# Patient Record
Sex: FEMALE | URBAN - METROPOLITAN AREA
[De-identification: names, ages, dates, MRNs, and addresses within clinical notes are randomized per-mention and may not be internally consistent; named-entity substitution may affect disease eponyms.]

---

## 2023-03-23 ENCOUNTER — APPOINTMENT (OUTPATIENT)
Dept: URBAN - METROPOLITAN AREA CLINIC 261 | Age: 43
Setting detail: DERMATOLOGY
End: 2023-03-23

## 2023-03-23 DIAGNOSIS — D22 MELANOCYTIC NEVI: ICD-10-CM

## 2023-03-23 DIAGNOSIS — H01.00 UNSPECIFIED BLEPHARITIS: ICD-10-CM

## 2023-03-23 DIAGNOSIS — Z12.83 ENCOUNTER FOR SCREENING FOR MALIGNANT NEOPLASM OF SKIN: ICD-10-CM

## 2023-03-23 DIAGNOSIS — L82.1 OTHER SEBORRHEIC KERATOSIS: ICD-10-CM

## 2023-03-23 DIAGNOSIS — L81.4 OTHER MELANIN HYPERPIGMENTATION: ICD-10-CM

## 2023-03-23 DIAGNOSIS — D18.0 HEMANGIOMA: ICD-10-CM

## 2023-03-23 PROBLEM — D22.5 MELANOCYTIC NEVI OF TRUNK: Status: ACTIVE | Noted: 2023-03-23

## 2023-03-23 PROBLEM — H01.009 UNSPECIFIED BLEPHARITIS UNSPECIFIED EYE, UNSPECIFIED EYELID: Status: ACTIVE | Noted: 2023-03-23

## 2023-03-23 PROBLEM — D18.01 HEMANGIOMA OF SKIN AND SUBCUTANEOUS TISSUE: Status: ACTIVE | Noted: 2023-03-23

## 2023-03-23 PROCEDURE — OTHER PRESCRIPTION: OTHER

## 2023-03-23 PROCEDURE — OTHER PRESCRIPTION MEDICATION MANAGEMENT: OTHER

## 2023-03-23 PROCEDURE — OTHER MIPS QUALITY: OTHER

## 2023-03-23 PROCEDURE — OTHER COUNSELING: OTHER

## 2023-03-23 PROCEDURE — OTHER ADDITIONAL NOTES: OTHER

## 2023-03-23 PROCEDURE — 99204 OFFICE O/P NEW MOD 45 MIN: CPT

## 2023-03-23 RX ORDER — TACROLIMUS 1 MG/G
OINTMENT TOPICAL BID
Qty: 100 | Refills: 10 | Status: ERX | COMMUNITY
Start: 2023-03-23

## 2023-03-23 ASSESSMENT — LOCATION SIMPLE DESCRIPTION DERM
LOCATION SIMPLE: ABDOMEN
LOCATION SIMPLE: LEFT UPPER BACK
LOCATION SIMPLE: RIGHT CHEEK
LOCATION SIMPLE: LEFT CHEEK
LOCATION SIMPLE: CHEST
LOCATION SIMPLE: RIGHT UPPER BACK

## 2023-03-23 ASSESSMENT — LOCATION DETAILED DESCRIPTION DERM
LOCATION DETAILED: RIGHT MEDIAL SUPERIOR CHEST
LOCATION DETAILED: LEFT SUPERIOR MEDIAL MALAR CHEEK
LOCATION DETAILED: LEFT MID-UPPER BACK
LOCATION DETAILED: EPIGASTRIC SKIN
LOCATION DETAILED: RIGHT INFERIOR UPPER BACK
LOCATION DETAILED: RIGHT SUPERIOR MEDIAL MALAR CHEEK
LOCATION DETAILED: LEFT CENTRAL MALAR CHEEK

## 2023-03-23 ASSESSMENT — LOCATION ZONE DERM
LOCATION ZONE: FACE
LOCATION ZONE: TRUNK

## 2023-03-23 NOTE — PROCEDURE: ADDITIONAL NOTES
Render Risk Assessment In Note?: no
Additional Notes: Discussed allergy referral- patient declines\\nZatidor eye drops\\nSleep hygeine \\nMagnesium 400mg nightly\\Leon filter and humidifier in bedroom\\nskincare 
Detail Level: Simple

## 2023-03-23 NOTE — PROCEDURE: PRESCRIPTION MEDICATION MANAGEMENT
Detail Level: Zone
Initiate Treatment: tacrolimus 0.1 % topical ointment apply to eyelids twice a day
Render In Strict Bullet Format?: No